# Patient Record
Sex: FEMALE | NOT HISPANIC OR LATINO | URBAN - NONMETROPOLITAN AREA
[De-identification: names, ages, dates, MRNs, and addresses within clinical notes are randomized per-mention and may not be internally consistent; named-entity substitution may affect disease eponyms.]

---

## 2018-08-02 ENCOUNTER — OFFICE VISIT CONVERTED (OUTPATIENT)
Dept: FAMILY MEDICINE CLINIC | Age: 23
End: 2018-08-02
Attending: NURSE PRACTITIONER

## 2019-08-15 ENCOUNTER — HOSPITAL ENCOUNTER (OUTPATIENT)
Dept: OTHER | Facility: HOSPITAL | Age: 24
Discharge: HOME | End: 2019-08-15
Attending: NURSE PRACTITIONER

## 2019-08-15 ENCOUNTER — OFFICE VISIT CONVERTED (OUTPATIENT)
Dept: FAMILY MEDICINE CLINIC | Age: 24
End: 2019-08-15
Attending: NURSE PRACTITIONER

## 2019-08-15 LAB
ALBUMIN SERPL-MCNC: 5.2 G/DL (ref 3.5–5)
ALBUMIN/GLOB SERPL: 1.8 {RATIO} (ref 1.4–2.6)
ALP SERPL-CCNC: 71 U/L (ref 42–98)
ALT SERPL-CCNC: 16 U/L (ref 10–40)
ANION GAP SERPL CALC-SCNC: 20 MMOL/L (ref 8–19)
AST SERPL-CCNC: 20 U/L (ref 15–50)
BILIRUB SERPL-MCNC: 0.75 MG/DL (ref 0.2–1.3)
BUN SERPL-MCNC: 10 MG/DL (ref 5–25)
BUN/CREAT SERPL: 11 {RATIO} (ref 6–20)
CALCIUM SERPL-MCNC: 9.5 MG/DL (ref 8.7–10.4)
CHLORIDE SERPL-SCNC: 102 MMOL/L (ref 99–111)
CONV CO2: 24 MMOL/L (ref 22–32)
CONV TOTAL PROTEIN: 8.1 G/DL (ref 6.3–8.2)
CREAT UR-MCNC: 0.88 MG/DL (ref 0.5–0.9)
ERYTHROCYTE [DISTWIDTH] IN BLOOD BY AUTOMATED COUNT: 11.7 % (ref 11.5–14.5)
GFR SERPLBLD BASED ON 1.73 SQ M-ARVRAT: >60 ML/MIN/{1.73_M2}
GLOBULIN UR ELPH-MCNC: 2.9 G/DL (ref 2–3.5)
GLUCOSE SERPL-MCNC: 95 MG/DL (ref 65–99)
HBA1C MFR BLD: 16 G/DL (ref 12–16)
HCT VFR BLD AUTO: 45.3 % (ref 37–47)
MAGNESIUM SERPL-MCNC: 2.21 MG/DL (ref 1.6–2.3)
MCH RBC QN AUTO: 31.1 PG (ref 27–31)
MCHC RBC AUTO-ENTMCNC: 35.3 G/DL (ref 33–37)
MCV RBC AUTO: 88.1 FL (ref 81–99)
OSMOLALITY SERPL CALC.SUM OF ELEC: 293 MOSM/KG (ref 273–304)
PLATELET # BLD AUTO: 216 10*3/UL (ref 130–400)
PMV BLD AUTO: 11 FL (ref 7.4–10.4)
POTASSIUM SERPL-SCNC: 4.1 MMOL/L (ref 3.5–5.3)
RBC # BLD AUTO: 5.14 10*6/UL (ref 4.2–5.4)
SODIUM SERPL-SCNC: 142 MMOL/L (ref 135–147)
TSH SERPL-ACNC: 1.1 M[IU]/L (ref 0.27–4.2)
WBC # BLD AUTO: 7.53 10*3/UL (ref 4.8–10.8)

## 2021-05-18 NOTE — PROGRESS NOTES
Alexandra Coronado 1995     Office/Outpatient Visit    Visit Date: Thu, Aug 15, 2019 10:10 am    Provider: Chichi Lopez N.P. (Assistant: Vashti Pineda RN)    Location: [facility]        Electronically signed by Chichi Lopez N.P. on  08/15/2019 12:35:16 PM                             SUBJECTIVE:        CC:     Ms. Coronado is a 23 year old White female.  Chest pain;         HPI:         With regard to the chest pain, other type, the discomfort is located primarily in the in the center of the chest.  The pain initially began one week ago.  Typically, individual episodes of chest pain are variable in duration.  She characterizes the pain as sharp and pressure.  Associated symptoms include palpitations ( this has improved ) and loose stools one week prior to chest pain starting.  She denies associated cough, nausea, vomiting, heartburn or fever.  Prior workup includes Holter monitor 11/2017 showed occ PACs and PVCs and sinus tachycardia.  Pertinent medical history is positive for anxiety disorder, palpitations, Gave birth to child in past year and exercise induced asthma in childhood.  Has taken Ibuprofen without improvement.  Has had some stress recently with moving and having 3 children under the age of 3.     ROS:     CONSTITUTIONAL:  Negative for chills and fever.      CARDIOVASCULAR:  Positive for chest pain and palpitations ( improved ).      RESPIRATORY:  Positive for dyspnea.   Negative for recent cough or frequent wheezing.      GASTROINTESTINAL:  Positive for loose stools one week prior to chest pain starting.   Negative for abdominal pain, heartburn or vomiting.      NEUROLOGICAL:  Negative for dizziness and headaches.      PSYCHIATRIC:  Positive for feelings of stress and sleep disturbance.   Negative for suicidal thoughts.          PMH/FMH/SH:     Last Reviewed on 8/15/2019 10:34 AM by Chichi Lopez    Past Medical History:             PREVENTIVE HEALTH MAINTENANCE             PAP SMEAR:  was last done 10/2018 with normal results             PAST MEDICAL HISTORY             GYNECOLOGICAL HISTORY:             CURRENT MEDICAL PROVIDERS:    Obstetrician/Gynecologist: Dr. Rutledge         Surgical History:     Other Surgeries    wisdom teeth removal;         Family History:     Father:    Positive for pacemaker;     ; Positive for Skin Cancer;     ; Positive for blood clot in lungs;     Mother:    Positive for Hypothyroidism;     Paternal Grandfather: Medical history unknown     Paternal Grandmother:    Positive for Type 2 Diabetes;     Maternal Grandfather:     ; Positive for Myocardial Infarction;     Maternal Grandmother:    Positive for bladder problems;         Social History:     Occupation: Homemaker     Marital Status:      Children: 3 children         Tobacco/Alcohol/Supplements:     Last Reviewed on 8/15/2019 10:12 AM by Vashti Pineda    Tobacco: She has never smoked.  Non-drinker         Substance Abuse History:     Last Reviewed on 2018 04:42 PM by Chichi Lopez         Mental Health History:     Last Reviewed on 2018 04:42 PM by Chichi Lopez        Generalized Anxiety Disorder    Panic Attacks         Communicable Diseases (eg STDs):     Last Reviewed on 2018 04:42 PM by Chichi Lopez    Reportable health conditions; NEGATIVE             Current Problems:     Last Reviewed on 2018 04:42 PM by Chichi Lopez    Pregnancy     Dizziness     Hemorrhoids, NOS     Chest pain, other type     Screening for depression         Immunizations:     None        Allergies:     Last Reviewed on 8/15/2019 10:10 AM by Vashti Pineda      No Known Drug Allergies.         Current Medications:     Last Reviewed on 8/15/2019 10:12 AM by Vashti Pineda    None        OBJECTIVE:        Vitals:         Current: 8/15/2019 10:17:02 AM    Ht:  5 ft, 5 in;  Wt: 123.2 lbs;  BMI: 20.5    T: 97.6 F (oral);  BP: 104/74 mm Hg (right arm, sitting);  P: 78 bpm (right arm (BP  Cuff), sitting);  sCr: 0.89 mg/dL;  GFR: 88.46    O2 Sat: 99 % (room air)        Exams:     PHYSICAL EXAM:     GENERAL: well developed, well nourished;  no apparent distress;     EYES: PERRL, EOMI     E/N/T: EARS: external auditory canal normal;  bilateral TMs are normal;  NOSE: normal turbinates; no sinus tenderness; OROPHARYNX: oral mucosa is normal; posterior pharynx shows no exudate and post nasal drip;     NECK: range of motion is normal; trachea is midline;     RESPIRATORY: normal respiratory rate and pattern with no distress; normal breath sounds with no rales, rhonchi, wheezes or rubs;     CARDIOVASCULAR: normal rate; rhythm is regular;     GASTROINTESTINAL: nontender; normal bowel sounds; no organomegaly;     MUSCULOSKELETAL: normal gait;     NEUROLOGIC: mental status: alert and oriented x 3; GROSSLY INTACT     PSYCHIATRIC: appropriate affect and demeanor; normal psychomotor function; normal speech pattern; normal thought and perception;         Lab/Test Results:         LABORATORY RESULTS: EKG performed by tls         ASSESSMENT           786.59   R07.89  Chest pain, other type              DDx:     V79.0   Z13.31  Screening for depression              DDx:         ORDERS:         Radiology/Test Orders:       [id number]  Electrocardiogram, routine with at least 12 leads; with interpretation and report  (In-House)         41552  Radiologic exam chest 2 views  (Send-Out)           Lab Orders:       [id number]  BDCB2 - [facility] CBC w/o diff  (Send-Out)         33341  COMP - [facility] Comp. Metabolic Panel  (Send-Out)         93582  MG - [facility] Magnesium, Serum  (Send-Out)         [id number]  TSH - [facility] TSH  (Send-Out)           Other Orders:         Depression screen negative  (In-House)           Negative EtOH screen  (In-House)                   PLAN:          Chest pain, other type ECG without acute ST or T wave abnormalities. Discussed differential including asthma, gallbladder disease, and anxiety. She is not interested in  medications for anxiety if other testing normal. Discussed other recommendations including exercise, healthy diet, and counseling.     LABORATORY:  Labs ordered to be performed today include CBC W/O DIFF, Comprehensive metabolic panel, Magnesium level, and TSH.      RADIOLOGY:  I have ordered a chest x-ray (PA and lateral) to be done today.      RECOMMENDATIONS given include: Further recommendation to be given after test results are complete.      FOLLOW-UP: for after testing           Orders:       [id number]  Electrocardiogram, routine with at least 12 leads; with interpretation and report  (In-House)         [id number]  Radiologic exam chest 2 views  (Send-Out)         33115  BDCB2 - [facility] CBC w/o diff  (Send-Out)         26885  COMP - [facility] Comp. Metabolic Panel  (Send-Out)         76085  MG - [facility] Magnesium, Serum  (Send-Out)         75532  TSH - [facility] TSH  (Send-Out)            Screening for depression     MIPS PHQ-9 Depression Screening: Completed form scanned and in chart; Total Score 8; Positive Depression Screen but after further evaluation the patient does not have a diagnosis of depression.  Negative alcohol screen           Orders:         Depression screen negative  (In-House)           Negative EtOH screen  (In-House)               CHARGE CAPTURE           **Please note: ICD descriptions below are intended for billing purposes only and may not represent clinical diagnoses**        Primary Diagnosis:         786.59 Chest pain, other type            R07.89    Other chest pain              Orders:          28040   Office/outpatient visit; established patient, level 3  (In-House)             59785   Electrocardiogram, routine with at least 12 leads; with interpretation and report  (In-House)           V79.0 Screening for depression            Z13.31    Encounter for screening for depression              Orders:             Depression screen negative  (In-House)                Negative EtOH screen   (In-House)

## 2021-05-18 NOTE — PROGRESS NOTES
Alexandra Coronado 1995     Office/Outpatient Visit    Visit Date: Thu, Aug 2, 2018 04:31 pm    Provider: Chichi Lopez N.P. (Assistant: Nat Edward LPN)    Location: [facility]        Electronically signed by Chichi Lopez N.P. on  2018 05:34:05 PM                             SUBJECTIVE:        CC:     Ms. Coronado is a 22 year old White female.  headache, sinus pressure, now in chest. 32 weeks pregnant;         HPI: Alexandra presents with c/o headache. This is mostly left sided. Headache started about one week ago. Followed by sinus congestion and chest congestion. Has taken Tylenol for symptoms. She is currently 32 weeks pregnant.         .     ROS:     CONSTITUTIONAL:  Negative for chills and fever.      EYES:  Negative for eye drainage and eye pain.      E/N/T:  Positive for nasal congestion and sinus congestion.   Negative for ear pain or sore throat.      CARDIOVASCULAR:  Negative for chest pain and palpitations.      RESPIRATORY:  Negative for dyspnea and frequent wheezing.      NEUROLOGICAL:  Positive for headaches.   Negative for dizziness.          PMH/FMH/SH:     Last Reviewed on 2018 04:42 PM by Chichi Lopez    Past Medical History:             PREVENTIVE HEALTH MAINTENANCE             PAP SMEAR: was last done 2017  [facility]             PAST MEDICAL HISTORY             CURRENT MEDICAL PROVIDERS:    Obstetrician/Gynecologist: Dr. Rutledge         Surgical History:     Other Surgeries    wisdom teeth removal;         Family History:     Father: Medical history unknown     Mother:    Positive for Hypothyroidism;     Paternal Grandfather: Medical history unknown     Paternal Grandmother:    Positive for Type 2 Diabetes;     Maternal Grandfather:     ; Positive for Myocardial Infarction;     Maternal Grandmother:    Positive for bladder problems;         Social History:     Occupation: Unemployed     Marital Status:      Children: 2  children         Tobacco/Alcohol/Supplements:     Last Reviewed on 8/02/2018 04:42 PM by Chichi Lopez    Tobacco: She has never smoked.  Non-drinker     Caffeine:  She admits to consuming caffeine via coffee ( 2 servings per day ).          Substance Abuse History:     Last Reviewed on 8/02/2018 04:42 PM by Chichi Lopez    None         Mental Health History:     Last Reviewed on 8/02/2018 04:42 PM by Chichi Lopez        Generalized Anxiety Disorder    Panic Attacks         Communicable Diseases (eg STDs):     Last Reviewed on 8/02/2018 04:42 PM by Chichi Lopez    Reportable health conditions; NEGATIVE             Current Problems:     Last Reviewed on 8/02/2018 04:42 PM by Chichi Lopez    Dizziness     Hemorrhoids, NOS         Immunizations:     None        Allergies:     Last Reviewed on 8/02/2018 04:42 PM by Chichi Lopez      No Known Drug Allergies.         Current Medications:     Last Reviewed on 8/02/2018 04:42 PM by Chichi Lopez    Prenatal Multivitamin Capsules 1 tab po daily         OBJECTIVE:        Vitals:         Current: 8/2/2018 4:35:48 PM    Ht:  5 ft, 5 in;  Wt: 153.1 lbs;  BMI: 25.5    T: 97.6 F (oral);  BP: 106/61 mm Hg (left arm, sitting);  P: 82 bpm (left arm (BP Cuff), sitting);  sCr: 0.89 mg/dL;  GFR: 97.85        Exams:     PHYSICAL EXAM:     GENERAL: well developed, well nourished;  no apparent distress;     EYES: PERRL, EOMI     E/N/T: EARS: external auditory canal normal;  both TMs are dull;  NOSE: normal turbinates; no sinus tenderness; OROPHARYNX: oral mucosa is normal; posterior pharynx shows no exudate and post nasal drip;     NECK: range of motion is normal; trachea is midline;     RESPIRATORY: normal respiratory rate and pattern with no distress; normal breath sounds with no rales, rhonchi, wheezes or rubs;     CARDIOVASCULAR: normal rate; rhythm is regular;     NEUROLOGIC: mental status: alert and oriented x 3; GROSSLY INTACT     PSYCHIATRIC: appropriate affect and demeanor;          Lab/Test Results:             Glucose, Urine:  Neg (08/02/2018),     Bilirubin, urine:  Negative (08/02/2018),     Ketones, Urine Strip:  Negative (08/02/2018),     Specific Gravity, urine:  1.015 (08/02/2018),     Blood in Urine:  negative (08/02/2018),     pH, urine:  7.0 (08/02/2018),     Protein Urine QL:  negative (08/02/2018),     Urobilinogen, urine:  0.2 E.U./dL (08/02/2018),     Nitrite, Urine:  Negative (08/02/2018),     Leukoctyes, urine:  Trace (08/02/2018),     Appearance:  Clear (08/02/2018),     collection source:  Clean-catch (08/02/2018),     Color:  Yellow (08/02/2018),             ASSESSMENT           465.9   J06.9  Acute upper respiratory infection              DDx:     784.0   R51  Headache              DDx:     V22.1   Z33.1  Pregnancy              DDx:         ORDERS:         Meds Prescribed:       Metoclopramide HCl 10mg Tablet 1 tab q6hrs prn  #15 (Fifteen) tablet(s) Refills: 0         Lab Orders:       [id number]  [facility] Urinalysis, automated, with micro  (Send-Out)                   PLAN:          Acute upper respiratory infection Anithistamine such as Claritin or Benadryl per package instructions. Nasal steroid spray such as Flonase per package instructions.         RECOMMENDATIONS given include: Push Fluids, Rest, Follow up if no improvement or worsening symptoms like high fevers, vomiting, weakness, or increasing shortness of air.    .      FOLLOW-UP: Schedule follow-up appointments on a p.r.n. basis. Chronic visit follow up          Headache Urine dip without protein. BP normal. No concern for preeclampsia. Headache likely due to sinus congestion. Advised to take allergy medications. Also given rx for reglan to take along with tylenol for headache. Keep scheduled follow up appointment with OBGYN next week. To ER for worsening symptoms.          Pregnancy As above.     LABORATORY:  Labs ordered to be performed today include urinalysis with micro.            Prescriptions:        Metoclopramide HCl 10mg Tablet 1 tab q6hrs prn  #15 (Fifteen) tablet(s) Refills: 0           Orders:       [id number]  [facility] Urinalysis, automated, with micro  (Send-Out)               Patient Recommendations:        For  Acute upper respiratory infection:     Schedule follow-up appointments as needed.              CHARGE CAPTURE           **Please note: ICD descriptions below are intended for billing purposes only and may not represent clinical diagnoses**        Primary Diagnosis:         465.9 Acute upper respiratory infection            J06.9    Acute upper respiratory infection, unspecified              Orders:          [id number]   Office/outpatient visit; established patient, level 4  (In-House)           784.0 Headache            R51    Headache    V22.1 Pregnancy            Z33.1    Pregnant state, incidental

## 2021-07-01 VITALS
WEIGHT: 123.2 LBS | HEART RATE: 78 BPM | OXYGEN SATURATION: 99 % | SYSTOLIC BLOOD PRESSURE: 104 MMHG | BODY MASS INDEX: 20.53 KG/M2 | TEMPERATURE: 97.6 F | DIASTOLIC BLOOD PRESSURE: 74 MMHG | HEIGHT: 65 IN

## 2021-07-01 VITALS
DIASTOLIC BLOOD PRESSURE: 61 MMHG | TEMPERATURE: 97.6 F | HEIGHT: 65 IN | SYSTOLIC BLOOD PRESSURE: 106 MMHG | WEIGHT: 153.1 LBS | BODY MASS INDEX: 25.51 KG/M2 | HEART RATE: 82 BPM